# Patient Record
Sex: FEMALE | Race: WHITE
[De-identification: names, ages, dates, MRNs, and addresses within clinical notes are randomized per-mention and may not be internally consistent; named-entity substitution may affect disease eponyms.]

---

## 2018-09-04 ENCOUNTER — HOSPITAL ENCOUNTER (EMERGENCY)
Dept: HOSPITAL 41 - JD.ED | Age: 28
Discharge: HOME | End: 2018-09-04
Payer: COMMERCIAL

## 2018-09-04 DIAGNOSIS — Z3A.01: ICD-10-CM

## 2018-09-04 DIAGNOSIS — O99.611: Primary | ICD-10-CM

## 2018-09-04 DIAGNOSIS — K80.50: ICD-10-CM

## 2018-09-04 DIAGNOSIS — Z88.2: ICD-10-CM

## 2018-09-04 PROCEDURE — 36415 COLL VENOUS BLD VENIPUNCTURE: CPT

## 2018-09-04 PROCEDURE — 86140 C-REACTIVE PROTEIN: CPT

## 2018-09-04 PROCEDURE — 96361 HYDRATE IV INFUSION ADD-ON: CPT

## 2018-09-04 PROCEDURE — 76705 ECHO EXAM OF ABDOMEN: CPT

## 2018-09-04 PROCEDURE — 96374 THER/PROPH/DIAG INJ IV PUSH: CPT

## 2018-09-04 PROCEDURE — 84702 CHORIONIC GONADOTROPIN TEST: CPT

## 2018-09-04 PROCEDURE — 83690 ASSAY OF LIPASE: CPT

## 2018-09-04 PROCEDURE — 96375 TX/PRO/DX INJ NEW DRUG ADDON: CPT

## 2018-09-04 PROCEDURE — 99285 EMERGENCY DEPT VISIT HI MDM: CPT

## 2018-09-04 NOTE — EDM.PDOC
ED HPI GENERAL MEDICAL PROBLEM





- General


Chief Complaint: Abdominal Pain


Stated Complaint: ABDOMIANL PAIN


Time Seen by Provider: 18 14:29


Source of Information: Reports: Patient


History Limitations: Reports: No Limitations





- History of Present Illness


INITIAL COMMENTS - FREE TEXT/NARRATIVE: 


Patient is a 28-year-old female who is approximately 5 weeks pregnant who 

presents ED complaining of epigastric and right upper quadrant abdominal pain. 

States this came on at about 12:30 today after eating lunch. Patient had eaten 

turkey with guacamole. She's had similar episodes over the past 7 months. It is 

described as a sharp crampy sensation worsened with eating. Nothing specific 

decreases the discomfort. Pain does radiate into her back. Currently rated a 7 

out of 10. This waxes and wanes with intensity. She's been pregnant 3 times 

total. She's had 2 miscarriages in the past. Last menstrual cycle was . 

She is  approximately 5 weeks pregnant. There is no abnormal vaginal bleeding. 

No abnormal vaginal discharge. Denies any constipation or diarrhea. She is not 

nauseated and has not had any episodes of emesis as we speak. She was seen by 

her PCP earlier today with lab work obtained. Just recently was on Keflex for 

her bladder infection. She denies any chest pain, shortness of breath, fever, 

diarrhea, increased flatulence, blood in her stool, dysuria, ingestion of bad 

or  questionable food, recent sick exposures, or any additional complaints. 


  ** middle upper abdomen/upper back


Pain Score (Numeric/FACES): 7





- Related Data


 Allergies











Allergy/AdvReac Type Severity Reaction Status Date / Time


 


Sulfa (Sulfonamide Allergy  Vomiting Verified 18 14:17





Antibiotics)     











Home Meds: 


 Home Meds





Acetaminophen/oxyCODONE [Percocet 325-5 MG] 1 each PO Q6HR PRN #12 tab 18 

[Rx]


Ascorbic Acid/Multivit-Min [Emergen-C 1,000 mg Packet] 1,000 mg PO DAILY  [History]


Cranberry 400 mg PO DAILY 18 [History]


Docosahexanoic Acid [Prenatal Dha] 200 mg PO DAILY 18 [History]


L.acidoph,Paracasei, B.lactis [Probiotic] 1 each PO DAILY 18 [History]


Ondansetron [Zofran ODT] 4 mg PO Q6H PRN #12 tab.dis 18 [Rx]











Past Medical History





- Past Health History


Medical/Surgical History: Denies Medical/Surgical History


Other HEENT History: wisdom teeth


OB/GYN History: Reports: Spontaneous 


Other OB/GYN History: miscarriage x 2





Social & Family History





- Family History


Family Medical History: Noncontributory





- Tobacco Use


Smoking Status *Q: Never Smoker


Second Hand Smoke Exposure: No





- Caffeine Use


Caffeine Use: Reports: Soda





- Recreational Drug Use


Recreational Drug Use: No





ED ROS GENERAL





- Review of Systems


Review Of Systems: See Below


Constitutional: Denies: Fever, Chills, Malaise, Weakness, Fatigue, Decreased 

Appetite


HEENT: Reports: No Symptoms


Respiratory: Reports: No Symptoms


Cardiovascular: Reports: No Symptoms


GI/Abdominal: Reports: Abdominal Pain (epigastric/ ruq).  Denies: Black Stool, 

Bloody Stool, Constipation, Diarrhea, Decreased Appetite, Difficulty Swallowing

, Distension, Flatus, Hematemesis, Hematochezia, Melena, Nausea, Vomiting


: Reports: No Symptoms


Musculoskeletal: Reports: Back Pain (midline thoracic back pain)


Skin: Reports: No Symptoms





ED EXAM, GI/ABD





- Physical Exam


Exam: See Below


Exam Limited By: No Limitations


General Appearance: Alert, WD/WN, Mild Distress


Ears: Hearing Grossly Normal


Nose: Normal Inspection


Throat/Mouth: Normal Voice, No Airway Compromise


Neck: Normal Inspection, Supple


Respiratory/Chest: No Respiratory Distress, Lungs Clear, Normal Breath Sounds, 

No Accessory Muscle Use, Chest Non-Tender


Cardiovascular: Normal Peripheral Pulses, Regular Rate, Rhythm, No Murmur


GI/Abdominal Exam: Normal Bowel Sounds, Soft, No Organomegaly, No Distention, 

Tender (epigastric is the Point of maximal pain.  RUQ pain present, mild in 

nature. )


Back Exam: Normal Inspection.  No: CVA Tenderness (L), CVA Tenderness (R)


Neurological: Alert, Oriented, CN II-XII Intact, Normal Cognition, No Motor/

Sensory Deficits


Psychiatric: Normal Affect, Normal Mood


Skin Exam: Warm, Dry, Intact, Normal Color, No Rash





Course





- Vital Signs


Last Recorded V/S: 


 Last Vital Signs











Temp  97 F   18 14:12


 


Pulse  117 H  18 14:12


 


Resp  18   18 14:12


 


BP  136/90   18 14:12


 


Pulse Ox  100   18 14:12














- Orders/Labs/Meds


Orders: 


 Active Orders 24 hr











 Category Date Time Status


 


 Peripheral IV Care [RC] .AS DIRECTED Care  18 16:38 Active


 


 Peripheral IV Insertion Adult [OM.PC] Routine Oth  18 16:37 Ordered











Labs: 


 Laboratory Tests











  18 Range/Units





  15:08 


 


C-Reactive Protein  0.2  (<1.0)  mg/dL


 


Lipase  123  ()  U/L


 


HCG, Quant  480.0  mIU/mL











Meds: 


Medications














Discontinued Medications














Generic Name Dose Route Start Last Admin





  Trade Name Freq  PRN Reason Stop Dose Admin


 


Al Hydroxide/Mg Hydroxide  Confirm  18 14:50  18 14:55





  Mag-Al Plus  Administered  18 14:51  Not Given





  Dose   





  30 ml   





  .ROUTE   





  .STK-MED ONE   





     





     





     





     


 


Al Hydroxide/Mg Hydroxide 30  0 ml  18 14:42  18 14:54





  ml/ Lidocaine HCl 15 ml  PO  18 14:43  45 ml





  ONETIME ONE   Administration





     





     





     





     


 


Hydromorphone HCl  0.5 mg  18 16:37  18 17:02





  Dilaudid  IVPUSH  18 16:38  0.5 mg





  ONETIME ONE   Administration





     





     





     





     


 


Sodium Chloride  1,000 mls @ 150 mls/hr  18 16:45  18 17:01





  Normal Saline  IV   150 mls/hr





  ASDIRECTED RYAN   Administration





     





     





     





     


 


Ondansetron HCl  4 mg  18 16:37  18 17:01





  Zofran  IVPUSH  18 16:38  4 mg





  ONETIME ONE   Administration





     





     





     





     


 


Ranitidine HCl  150 mg  18 16:25  18 17:20





  Zantac  PO  18 16:26  Not Given





  ONETIME ONE   





     





     





     





     


 


Sodium Chloride  10 ml  18 16:37  18 17:01





  Saline Flush  FLUSH   10 ml





  ASDIRECTED PRN   Administration





  Keep Vein Open   





     





     





     














- Re-Assessments/Exams


Free Text/Narrative Re-Assessment/Exam: 





18 14:40 Attempting to obtain medical records/lab results from [facility] 

obtained earlier today.  In the meantime will order GI cocktail.  Suspect this 

is gallbladder related thus if no relief IV will be obtained with 

administration of narcotic pain meds and IVF's.  Patient last ate at 1230 with 

onset of pain shortly after. Will have to wait a few hrs prior to obtaining 

Ultrasound.  She has a ultrasound scheduled for tomorrow morning at 9:30.





2018 Labs obtained at [facility] today included: CBC and C14.  CBC was 

essentially normal. Chemistry panel was essentially normal as well. Will obtain 

lipase, UA, and hCG quantitative.





18 15:22 Reassessment, patient states all her discomfort is gone after 

the GI cocktail. Will await for labs to comeback. Patient will have ultrasound 

completed tomorrow as scheduled. She refuses to wait and have this completed 

today.  





CRP 0.2. Lipase 123. HCG quantitative 480. This consistent with pregnancy 

between 1-4 weeks in gestation.





Per nursing staff patient complaining of some epigastric discomfort similar to 

discomfort she was experiencing on admission. I will order Zantac 150 mg by 

mouth per verbal order. 





163 I reassessed the patient.  She is wishin to have the ultraound completed 

in the E.D. I will treat her pain with dilaudid 0.5mg IVp, zofran 4mg IVP, and 

NS 150mls/hr.  Patient will have to wait approximately 2 more hours for 

ultrasound to be completed.  Ultrasound has been ordered. 





Ultrasound impression: Small gallstone within the gallbladder. No gallbladder 

wall thickening or biliary duct dilatation. Possibly fatty infiltration within 

the liver. No additional abnormalities identified a right upper quadrant 

abdominal ultrasound.








18 181 Reassessment,Patient has no epigastric or right upper quadrant 

abdominal pain since being administered the Dilaudid IVP. Discussed with the 

patient most likely this is related to biliary colic. Will speak with general 

surgeon on-call.





 Spoke with Dr. Peña on call General Surgeon.  He will see the patient in 

the E.D. and discuss options. 





18 190 Dr. Peña has spoken with the patient.  Will attempt to hold off 

in performing and surgery until the 2nd trimester.  Suggested pain management 

consisting of short course of narcotics with diet modification in hopes this 

will stop reoccurring symptoms.  Patient agrees with plan.  The patient 

remained hemodynamically stable while under my care in the E.D. I discussed the 

concerning symptoms for which to return to the E.D. with the patient/family. 

The patient/family verbalized understanding. All questions were answered. 








Departure





- Departure


Time of Disposition: 16:01


Disposition: Home, Self-Care 01


Condition: Good


Clinical Impression: 


 Biliary colic, Choledocholithiasis








- Discharge Information


Prescriptions: 


Acetaminophen/oxyCODONE [Percocet 325-5 MG] 1 each PO Q6HR PRN #12 tab


 PRN Reason: Pain (Severe 7-10)


Ondansetron [Zofran ODT] 4 mg PO Q6H PRN #12 tab.dis


 PRN Reason: Nausea/Vomiting


Instructions:  Gastritis, Adult, Easy-to-Read, Cholelithiasis, Easy-to-Read, 

Pain Medicine Instructions, Easy-to-Read


Referrals: 


PCP,None [Primary Care Provider] - 


Forms:  ED Department Discharge


Additional Instructions: 


As discussed will try to refrain from surgery if able until the 2nd trimester.  

Thus will utilize percocet 1 tab every 6 hrs for severe pain.  Utilize zofran 1 

tab wvery 6 hrs for nausea.  Stick with the diet provided to reduce gallbladder 

attacks.  If at any time you experience worsening pain, fever, chills, n/v, 

please return to the E.D. for reevaluation.  Followup with PCP in the next 3 to 

5 days for reevaluation as needed. 





- My Orders


Last 24 Hours: 


My Active Orders





18 16:37


Peripheral IV Insertion Adult [OM.PC] Routine 





18 16:38


Peripheral IV Care [RC] .AS DIRECTED 














- Assessment/Plan


Last 24 Hours: 


My Active Orders





18 16:37


Peripheral IV Insertion Adult [OM.PC] Routine 





18 16:38


Peripheral IV Care [RC] .AS DIRECTED

## 2018-09-04 NOTE — PCM.CONS
H&P History of Present Illness





- General


Date of Service: 18


Admit Problem/Dx: 





biliary colic


Source of Information: Patient


History Limitations: Reports: No Limitations





- History of Present Illness


Initial Comments - Free Text/Narative: 





29 yo female, currently pregnant 5 weeks EGA, presents with intermittent 

epigastric abdominal pain that radiates to her RIGHT side and wraps around her 

back. Pain episodes started a few days ago, each episode occurring a few hours 

after meals, and lasting 3-6 hours, resolving spontaneously. This episode 

lasted about 3 hours, occurring shortly after eating a [employer] sandwich with 

avocado. She came to the ER and received a GI cocktail with relief of her 

symptoms, but her symptoms returned prior to leaving the ER. She then received 

Dilaudid. Currently, she rates pain 0/10.


Denies nausea/emesis. Denies diarrhea/constipation. No yellowing of eyes or 

skin. No acholic stools or tea-colored urine. No fevers/chills.


  ** middle upper abdomen/upper back


Pain Score (Numeric/FACES): 7





- Related Data


Allergies/Adverse Reactions: 


 Allergies











Allergy/AdvReac Type Severity Reaction Status Date / Time


 


Sulfa (Sulfonamide Allergy  Vomiting Verified 18 14:17





Antibiotics)     











Home Medications: 


 Home Meds





Acetaminophen/oxyCODONE [Percocet 325-5 MG] 1 each PO Q6HR PRN #12 tab 18 

[Rx]


Ascorbic Acid/Multivit-Min [Emergen-C 1,000 mg Packet] 1,000 mg PO DAILY  [History]


Cranberry 400 mg PO DAILY 18 [History]


Docosahexanoic Acid [Prenatal Dha] 200 mg PO DAILY 18 [History]


L.acidoph,Paracasei, B.lactis [Probiotic] 1 each PO DAILY 18 [History]


Ondansetron [Zofran ODT] 4 mg PO Q6H PRN #12 tab.dis 18 [Rx]











Past Medical History





- Past Health History


Medical/Surgical History: Denies Medical/Surgical History


Other HEENT History: wisdom teeth


OB/GYN History: Reports: Spontaneous 


Other OB/BYN History: miscarriage x 2





Social & Family History





- Family History


Family Medical History: Noncontributory





- Tobacco Use


Smoking Status *Q: Never Smoker


Second Hand Smoke Exposure: No





- Caffeine Use


Caffeine Use: Reports: Soda





- Recreational Drug Use


Recreational Drug Use: No





- Living Situation & Occupation


Living situation: Reports: 





H&P Review of Systems





- Review of Systems:


Review Of Systems: ROS reveals no pertinent complaints other than HPI.





Exam





- Exam


Exam: See Below





- Vital Signs


Vital Signs: 


 Last Vital Signs











Temp  36.1 C   18 14:12


 


Pulse  117 H  18 14:12


 


Resp  18   18 14:12


 


BP  136/90   18 14:12


 


Pulse Ox  100   18 14:12











Weight: 83.007 kg





- Exam


General: Alert, Oriented


HEENT: Conjunctiva Clear.  No: Scleral Icterus


Neck: Supple.  No: Lymphadenopathy


Lungs: Clear to Auscultation, Normal Respiratory Effort


Cardiovascular: Regular Rate, Regular Rhythm, Normal S1, Normal S2.  No: 

Systolic Murmur


GI/Abdominal Exam: Soft, Non-Tender, No Distention


Extremities: Normal Inspection


Skin: Warm, Dry, Intact


Neuro Extensive - Mental Status: Alert, Normal Mood/Affect, Normal Cognition, 

Memory Intact


Psychiatric: Alert, Normal Affect, Normal Mood





- Patient Data


Lab Results Last 24 hrs: 


 Laboratory Results - last 24 hr











  18 Range/Units





  15:08 


 


C-Reactive Protein  0.2  (<1.0)  mg/dL


 


Lipase  123  ()  U/L


 


HCG, Quant  480.0  mIU/mL














Consult PN Assessment/Plan


Procedures: 


Procedures





TRANSVAGINAL US OBSTETRIC (18)








(1) Biliary colic


SNOMED Code(s): 02892982


   Code(s): K80.50 - CALCULUS OF BILE DUCT W/O CHOLANGITIS OR CHOLECYST W/O 

OBST   Current Visit: Yes   


Problem List Initiated/Reviewed/Updated: Yes


Plan: 





29 yo female, in her first trimester of pregnancy (approx 5 weeks EGA), with 

biliary colic.


- Patient educated on the diagnosis, discussed treatment options, and how her 

pregnancy relates to treatment.


- If symptoms can be controlled, will try to avoid surgery during the first 

trimester (period of organogenesis). Recommend avoiding surgery until the 2nd 

trimester, unless symptoms become progressively worse and/or inhibits her 

ability to take in calories.


- If she develops acute cholecystitis, she may need urgent surgical 

intervention.


- May try dietary modifications to avoid future attacks.


- Pain medication can be used in the short-term.


- Return precautions were given.


- Patient safe for discharge home from the ER.





Plan of care discussed with ER staff.





Jerome Baca M.D., F.A.C.S.


General Surgery


Pager: [phone]


Requesting Provider: Drew Velez


Date Consult Requested: 18


Reason for Consult: biliary colic in pregnant woman


Patient History Reviewed: Yes


Time Spent (in minutes): 40

## 2018-09-04 NOTE — PCM.CONSN
- General Info


Date of Service: 09/04/18


Admission Dx/Problem (Free Text): 





biliary colic


Subjective Update: 





29 yo female, currently pregnant 5 weeks EGA, presents with intermittent 

epigastric abdominal pain that radiates to her RIGHT side and wraps around her 

back. Pain episodes started a few days ago, each episode occurring a few hours 

after meals, and lasting 3-6 hours, resolving spontaneously. This episode 

lasted about 3 hours, occurring shortly after eating a [employer] sandwich with 

avocado. She came to the ER and received a GI cocktail with relief of her 

symptoms, but her symptoms returned prior to leaving the ER. She then received 

Dilaudid. Currently, she rates pain 0/10.


Denies nausea/emesis. Denies diarrhea/constipation. No yellowing of eyes or 

skin. No acholic stools or tea-colored urine. No fevers/chills.





- Patient Data


Vitals - Most Recent: 


 Last Vital Signs











Temp  36.1 C   09/04/18 14:12


 


Pulse  117 H  09/04/18 14:12


 


Resp  18   09/04/18 14:12


 


BP  136/90   09/04/18 14:12


 


Pulse Ox  100   09/04/18 14:12











Weight - Most Recent: 83.007 kg


Lab Results Last 24 Hours: 


 Laboratory Results - last 24 hr











  09/04/18 Range/Units





  15:08 


 


C-Reactive Protein  0.2  (<1.0)  mg/dL


 


Lipase  123  ()  U/L


 


HCG, Quant  480.0  mIU/mL











Med Orders - Current: 


 Current Medications





Sodium Chloride (Normal Saline)  1,000 mls @ 150 mls/hr IV ASDIRECTED RYAN


   Last Admin: 09/04/18 17:01 Dose:  150 mls/hr


Sodium Chloride (Saline Flush)  10 ml FLUSH ASDIRECTED PRN


   PRN Reason: Keep Vein Open


   Last Admin: 09/04/18 17:01 Dose:  10 ml





Discontinued Medications





Al Hydroxide/Mg Hydroxide (Mag-Al Plus) Confirm Administered Dose 30 ml .ROUTE 

.STK-MED ONE


   Stop: 09/04/18 14:51


   Last Admin: 09/04/18 14:55 Dose:  Not Given


Al Hydroxide/Mg Hydroxide 30 (ml/ Lidocaine HCl 15 ml)  0 ml PO ONETIME ONE


   Stop: 09/04/18 14:43


   Last Admin: 09/04/18 14:54 Dose:  45 ml


Hydromorphone HCl (Dilaudid)  0.5 mg IVPUSH ONETIME ONE


   Stop: 09/04/18 16:38


   Last Admin: 09/04/18 17:02 Dose:  0.5 mg


Ondansetron HCl (Zofran)  4 mg IVPUSH ONETIME ONE


   Stop: 09/04/18 16:38


   Last Admin: 09/04/18 17:01 Dose:  4 mg


Ranitidine HCl (Zantac)  150 mg PO ONETIME ONE


   Stop: 09/04/18 16:26


   Last Admin: 09/04/18 17:20 Dose:  Not Given











- Exam


General: Alert, Oriented, Cooperative


HEENT: No: Scleral Icterus


Neck: Supple.  No: Lymphadenopathy


Lungs: Clear to Auscultation, Normal Respiratory Effort


Cardiovascular: Regular Rate, Regular Rhythm, No Murmurs


GI/Abdominal Exam: Soft, Non-Tender, No Distention


Extremities: Normal Inspection


Skin: Warm, Dry, Intact


Psy/Mental Status: Alert, Normal Affect, Normal Mood





Consult PN Assessment/Plan


Procedures: 


Procedures





TRANSVAGINAL US OBSTETRIC (05/29/18)

## 2018-09-04 NOTE — US
Limited abdominal ultrasound: Multiple real-time images of the upper 

right abdomen were obtained.

 

Comparison: Previous right upper quadrant abdominal ultrasound of 

03/02/12 is available

 

Small echogenic structure is noted within the gallbladder most likely 

due to a small gallstone.  No gallbladder wall thickening or biliary 

duct dilatation is seen.  Liver shows no focal parenchymal abnormality

 but appears to be slightly echogenic.  Right kidney shows no 

hydronephrosis or mass has length of around 10.0 cm.  Pancreas is 

poorly seen due to bowel gas.  No abnormality is seen within the 

visualized portions of the pancreas.

 

Impression:

1.  Small gallstone within the gallbladder.  No gallbladder wall 

thickening or biliary duct dilatation.

2.  Possible fatty infiltration within the liver.

3.  No additional abnormality is identified on right upper quadrant 

abdominal ultrasound.

 

Diagnostic code #3

## 2023-06-27 ENCOUNTER — HOSPITAL ENCOUNTER (OUTPATIENT)
Dept: HOSPITAL 41 - JD.OB | Age: 33
Setting detail: OBSERVATION
Discharge: HOME | End: 2023-06-27
Attending: FAMILY MEDICINE | Admitting: FAMILY MEDICINE
Payer: COMMERCIAL

## 2023-06-27 DIAGNOSIS — Z88.2: ICD-10-CM

## 2023-06-27 DIAGNOSIS — Z79.899: ICD-10-CM

## 2023-06-27 DIAGNOSIS — Z3A.39: ICD-10-CM

## 2023-06-27 DIAGNOSIS — Z79.82: ICD-10-CM

## 2023-06-27 DIAGNOSIS — O23.03: Primary | ICD-10-CM

## 2023-06-27 LAB
ALBUMIN SERPL-MCNC: 2.3 G/DL (ref 3.4–5)
ALBUMIN/GLOB SERPL: 0.6 {RATIO} (ref 1–2)
ALP SERPL-CCNC: 106 U/L (ref 46–116)
ALT SERPL-CCNC: 16 U/L (ref 14–59)
ANION GAP SERPL CALC-SCNC: 19.3 MMOL/L (ref 5–15)
APPEARANCE UR: CLEAR
AST SERPL-CCNC: 15 U/L (ref 15–37)
BACTERIA URNS QL MICRO: (no result) /HPF
BASOPHILS # BLD AUTO: 0.01 K/MM3 (ref 0.01–0.08)
BASOPHILS NFR BLD AUTO: 0.1 % (ref 0.1–1.2)
BILIRUB SERPL-MCNC: 0.4 MG/DL (ref 0.2–1)
BILIRUB UR STRIP-MCNC: NEGATIVE MG/DL
BUN SERPL-MCNC: 5 MG/DL (ref 7–18)
BUN/CREAT SERPL: 7.1 (ref 14–18)
CALCIUM SERPL-MCNC: 8.7 MG/DL (ref 8.5–10.1)
CHLORIDE SERPL-SCNC: 105 MEQ/L (ref 98–107)
CO2 SERPL-SCNC: 17 MEQ/L (ref 21–32)
COLOR UR: YELLOW
CREAT CL 24H UR+SERPL-VRATE: 99.63 ML/MIN
CREAT SERPL-MCNC: 0.7 MG/DL (ref 0.55–1.02)
EGFRCR SERPLBLD CKD-EPI 2021: 118 ML/MIN (ref 60–?)
EOSINOPHIL # BLD AUTO: 0.01 K/MM3 (ref 0.04–0.36)
EOSINOPHIL NFR BLD AUTO: 0.1 % (ref 0.7–5.8)
EPI CELLS #/AREA URNS HPF: (no result) /HPF (ref 0–5)
GLOBULIN SER-MCNC: 4 GM/DL
GLUCOSE SERPL-MCNC: 109 MG/DL (ref 70–99)
GLUCOSE UR STRIP-MCNC: NEGATIVE MG/DL
HCT VFR BLD AUTO: 39 % (ref 34.1–44.9)
HGB BLD-MCNC: 13.1 GM/DL (ref 11.2–15.7)
IMM GRANULOCYTES # BLD: 0.09 K/MM3 (ref 0–0.1)
IMM GRANULOCYTES NFR BLD: 0.5 % (ref ?–1)
KETONES UR STRIP-MCNC: (no result) MG/DL
LYMPHOCYTES # BLD AUTO: 0.44 K/MM3 (ref 1.18–3.74)
LYMPHOCYTES NFR BLD AUTO: 2.6 % (ref 19.3–51.7)
MCH RBC QN AUTO: 33.2 PG (ref 25.6–32.2)
MCHC RBC AUTO-ENTMCNC: 33.6 G/DL (ref 32.2–35.5)
MCHC RBC AUTO-ENTMCNC: 99 FL (ref 79.4–94.8)
MONOCYTES # BLD AUTO: 0.67 K/MM3 (ref 0.24–0.36)
MONOCYTES NFR BLD AUTO: 3.9 % (ref 4.7–12.5)
MUCOUS THREADS URNS QL MICRO: (no result) /HPF
NEUTROPHILS # BLD AUTO: 15.88 K/MM3 (ref 1.56–6.13)
NEUTROPHILS NFR BLD AUTO: 92.8 % (ref 34–71.1)
NITRITE UR QL: POSITIVE
PATH REV BLD -IMP: (no result)
PH UR STRIP: 7 [PH] (ref 5–8)
PLATELET # BLD AUTO: 211 K/MM3 (ref 182–369)
PMV BLD AUTO: 9.6 FL (ref 9.4–12.3)
POTASSIUM SERPL-SCNC: 3.3 MEQ/L (ref 3.5–5.1)
PROT SERPL-MCNC: 6.3 G/DL (ref 6.4–8.2)
PROT UR STRIP-MCNC: (no result) MG/DL
RBC # BLD AUTO: 3.94 M/MM3 (ref 3.98–5.22)
RBC # URNS HPF: (no result) /HPF (ref 0–5)
RBC UR QL: (no result)
SODIUM SERPL-SCNC: 138 MEQ/L (ref 136–145)
SP GR UR STRIP: 1.02 (ref 1–1.03)
UROBILINOGEN UR STRIP-ACNC: 0.2 (ref 0.2–1)
WBC # BLD AUTO: 17.1 K/MM3 (ref 3.98–10.04)
WBC UR QL: (no result) /HPF (ref 0–5)

## 2023-06-27 PROCEDURE — 74176 CT ABD & PELVIS W/O CONTRAST: CPT

## 2023-06-27 PROCEDURE — 59025 FETAL NON-STRESS TEST: CPT

## 2023-06-27 PROCEDURE — 85025 COMPLETE CBC W/AUTO DIFF WBC: CPT

## 2023-06-27 PROCEDURE — 96361 HYDRATE IV INFUSION ADD-ON: CPT

## 2023-06-27 PROCEDURE — 87086 URINE CULTURE/COLONY COUNT: CPT

## 2023-06-27 PROCEDURE — 96360 HYDRATION IV INFUSION INIT: CPT

## 2023-06-27 PROCEDURE — 96365 THER/PROPH/DIAG IV INF INIT: CPT

## 2023-06-27 PROCEDURE — 81001 URINALYSIS AUTO W/SCOPE: CPT

## 2023-06-27 PROCEDURE — 36415 COLL VENOUS BLD VENIPUNCTURE: CPT

## 2023-06-27 PROCEDURE — 80053 COMPREHEN METABOLIC PANEL: CPT
